# Patient Record
Sex: MALE | Race: BLACK OR AFRICAN AMERICAN | URBAN - METROPOLITAN AREA
[De-identification: names, ages, dates, MRNs, and addresses within clinical notes are randomized per-mention and may not be internally consistent; named-entity substitution may affect disease eponyms.]

---

## 2024-06-08 ENCOUNTER — HOSPITAL ENCOUNTER (EMERGENCY)
Age: 59
Discharge: HOME | End: 2024-06-08
Attending: EMERGENCY MEDICINE

## 2024-06-08 VITALS
WEIGHT: 220.5 LBS | OXYGEN SATURATION: 99 % | DIASTOLIC BLOOD PRESSURE: 95 MMHG | BODY MASS INDEX: 28.3 KG/M2 | HEART RATE: 63 BPM | SYSTOLIC BLOOD PRESSURE: 173 MMHG | HEIGHT: 74 IN | TEMPERATURE: 98 F | RESPIRATION RATE: 18 BRPM

## 2024-06-08 DIAGNOSIS — Z76.0 ENCOUNTER FOR MEDICATION REFILL: Primary | ICD-10-CM

## 2024-06-08 PROCEDURE — 99283 EMERGENCY DEPT VISIT LOW MDM: CPT

## 2024-06-08 RX ORDER — TELMISARTAN AND AMLODIPINE 10; 80 MG/1; MG/1
1 TABLET ORAL DAILY
Qty: 15 TABLET | Refills: 0 | Status: SHIPPED | OUTPATIENT
Start: 2024-06-08

## 2024-06-08 RX ORDER — TELMISARTAN AND AMLODIPINE 5; 80 MG/1; MG/1
1 TABLET ORAL DAILY
COMMUNITY

## 2024-06-09 NOTE — ED PROVIDER NOTES
[facility] EMERGENCY DEPARTMENT  eMERGENCY dEPARTMENT eNCOUnter      Pt Name: Todd Lezama  MRN: [id number]  Birthdate 1965  Date of evaluation: 6/8/2024  Provider: Jason Leonardo MD  PCP: No primary care provider on file.      CHIEF COMPLAINT       Chief Complaint   Patient presents with    Medication Refill    Hypertension     Pt states he ran out of his BP medication.        HISTORY OFPRESENT ILLNESS   (Location/Symptom, Timing/Onset, Context/Setting, Quality, Duration, Modifying Factors,Severity)  Note limiting factors.     Todd Lezama is a 58 y.o. male says that he ran out of his blood pressure medication 3 days ago he takes something called Twynsta he is asymptomatic    Nursing Notes were all reviewed and agreed with or any disagreements were addressed  in the HPI.    REVIEW OF SYSTEMS    (2-9 systems for level 4, 10 or more for level 5)     Review of Systems    Positives and Pertinent negatives as per HPI.  Except as noted above in the ROS, all other systems were reviewed andnegative.       PASTMEDICAL HISTORY   No past medical history on file.      SURGICAL HISTORY     No past surgical history on file.      CURRENT MEDICATIONS       Previous Medications    No medications on file       ALLERGIES     Patient has no allergy information on record.    FAMILY HISTORY     No family history on file.       SOCIAL HISTORY       Social History     Socioeconomic History    Marital status: Single       SCREENINGS      @FLOW([id number])@      PHYSICAL EXAM    (up to 7 for level 4, 8 or more for level 5)     ED Triage Vitals [06/08/24 2043]   BP Temp Temp Source Pulse Respirations SpO2 Height Weight - Scale   (!) 182/100 98 °F (36.7 °C) Oral 63 18 99 % 1.88 m (6' 2\") 100 kg (220 lb 8 oz)       Physical Exam      General Appearance:  Alert, cooperative, no distress, appears stated age.   Head:  Normocephalic, without obviousabnormality, atraumatic.   Eyes:  conjunctiva/corneas clear, EOM's intact.

## 2024-06-09 NOTE — DISCHARGE INSTRUCTIONS
Discharge home  Fill your blood pressure prescription  Follow-up with your primary care doctor or establish a relationship with a primary care doctor at [facility] outpatient clinic